# Patient Record
Sex: FEMALE | Race: WHITE | NOT HISPANIC OR LATINO | URBAN - NONMETROPOLITAN AREA
[De-identification: names, ages, dates, MRNs, and addresses within clinical notes are randomized per-mention and may not be internally consistent; named-entity substitution may affect disease eponyms.]

---

## 2017-10-24 ENCOUNTER — APPOINTMENT (RX ONLY)
Dept: URBAN - NONMETROPOLITAN AREA CLINIC 22 | Facility: CLINIC | Age: 82
Setting detail: DERMATOLOGY
End: 2017-10-24

## 2017-10-24 VITALS — HEIGHT: 65 IN | WEIGHT: 115 LBS

## 2017-10-24 DIAGNOSIS — L85.3 XEROSIS CUTIS: ICD-10-CM

## 2017-10-24 DIAGNOSIS — L57.8 OTHER SKIN CHANGES DUE TO CHRONIC EXPOSURE TO NONIONIZING RADIATION: ICD-10-CM

## 2017-10-24 DIAGNOSIS — D22 MELANOCYTIC NEVI: ICD-10-CM

## 2017-10-24 DIAGNOSIS — L81.4 OTHER MELANIN HYPERPIGMENTATION: ICD-10-CM

## 2017-10-24 DIAGNOSIS — L53.8 OTHER SPECIFIED ERYTHEMATOUS CONDITIONS: ICD-10-CM

## 2017-10-24 DIAGNOSIS — L65.9 NONSCARRING HAIR LOSS, UNSPECIFIED: ICD-10-CM

## 2017-10-24 PROBLEM — M12.9 ARTHROPATHY, UNSPECIFIED: Status: ACTIVE | Noted: 2017-10-24

## 2017-10-24 PROBLEM — D22.5 MELANOCYTIC NEVI OF TRUNK: Status: ACTIVE | Noted: 2017-10-24

## 2017-10-24 PROBLEM — L55.1 SUNBURN OF SECOND DEGREE: Status: ACTIVE | Noted: 2017-10-24

## 2017-10-24 PROBLEM — I10 ESSENTIAL (PRIMARY) HYPERTENSION: Status: ACTIVE | Noted: 2017-10-24

## 2017-10-24 PROBLEM — I63.50 CEREBRAL INFARCTION DUE TO UNSPECIFIED OCCLUSION OR STENOSIS OF UNSPECIFIED CEREBRAL ARTERY: Status: ACTIVE | Noted: 2017-10-24

## 2017-10-24 PROCEDURE — ? COUNSELING

## 2017-10-24 PROCEDURE — 99214 OFFICE O/P EST MOD 30 MIN: CPT

## 2017-10-24 PROCEDURE — ? RECOMMENDATIONS

## 2017-10-24 PROCEDURE — ? OTHER

## 2017-10-24 PROCEDURE — ? PATIENT SPECIFIC COUNSELING

## 2017-10-24 ASSESSMENT — LOCATION SIMPLE DESCRIPTION DERM
LOCATION SIMPLE: LEFT LOWER BACK
LOCATION SIMPLE: RIGHT UPPER BACK
LOCATION SIMPLE: LEFT SCALP
LOCATION SIMPLE: RIGHT CHEEK

## 2017-10-24 ASSESSMENT — LOCATION DETAILED DESCRIPTION DERM
LOCATION DETAILED: LEFT MEDIAL FRONTAL SCALP
LOCATION DETAILED: LEFT INFERIOR MEDIAL MIDBACK
LOCATION DETAILED: RIGHT MEDIAL UPPER BACK
LOCATION DETAILED: RIGHT LATERAL MALAR CHEEK
LOCATION DETAILED: RIGHT INFERIOR CENTRAL MALAR CHEEK

## 2017-10-24 ASSESSMENT — LOCATION ZONE DERM
LOCATION ZONE: TRUNK
LOCATION ZONE: FACE
LOCATION ZONE: SCALP

## 2017-10-24 NOTE — HPI: PHOTOAGING (ACTINIC DAMAGE)
Is This A New Presentation, Or A Follow-Up?: Sun Damage
Additional History: Pt is good with SPF when goes out in the sun.

## 2017-10-24 NOTE — PROCEDURE: PATIENT SPECIFIC COUNSELING
Not sure if this is an IBR or a small eczematous area.  Pt will  OTC HC and apply to area when starts to itch to help calm down. Pt will call if does not resolve.
Discussed use of [employer] [place]
Detail Level: Detailed

## 2017-10-24 NOTE — PROCEDURE: OTHER
Other (Free Text): Most likely related to the thyroid problem and will hopefully get that straightened out. There is some pattern accentuation which may be playing a role.
Note Text (......Xxx Chief Complaint.): This diagnosis correlates with the 2nd complaint
Detail Level: Zone
Other (Free Text): No changes noticed compared to photo.

## 2017-10-24 NOTE — PROCEDURE: RECOMMENDATIONS
Recommendation Preamble: The following recommendations were made during the visit:
Recommendations (Free Text): Rogaine if was interested
Detail Level: Zone

## 2018-10-30 ENCOUNTER — APPOINTMENT (RX ONLY)
Dept: URBAN - NONMETROPOLITAN AREA CLINIC 22 | Facility: CLINIC | Age: 83
Setting detail: DERMATOLOGY
End: 2018-10-30

## 2018-10-30 VITALS — WEIGHT: 120 LBS | HEIGHT: 65 IN

## 2018-10-30 DIAGNOSIS — D485 NEOPLASM OF UNCERTAIN BEHAVIOR OF SKIN: ICD-10-CM

## 2018-10-30 DIAGNOSIS — D22 MELANOCYTIC NEVI: ICD-10-CM

## 2018-10-30 DIAGNOSIS — L57.8 OTHER SKIN CHANGES DUE TO CHRONIC EXPOSURE TO NONIONIZING RADIATION: ICD-10-CM

## 2018-10-30 DIAGNOSIS — L57.0 ACTINIC KERATOSIS: ICD-10-CM

## 2018-10-30 PROBLEM — D22.5 MELANOCYTIC NEVI OF TRUNK: Status: ACTIVE | Noted: 2018-10-30

## 2018-10-30 PROBLEM — M12.9 ARTHROPATHY, UNSPECIFIED: Status: ACTIVE | Noted: 2018-10-30

## 2018-10-30 PROBLEM — G40.909 EPILEPSY, UNSPECIFIED, NOT INTRACTABLE, WITHOUT STATUS EPILEPTICUS: Status: ACTIVE | Noted: 2018-10-30

## 2018-10-30 PROBLEM — D48.5 NEOPLASM OF UNCERTAIN BEHAVIOR OF SKIN: Status: ACTIVE | Noted: 2018-10-30

## 2018-10-30 PROBLEM — I10 ESSENTIAL (PRIMARY) HYPERTENSION: Status: ACTIVE | Noted: 2018-10-30

## 2018-10-30 PROCEDURE — ? PRESCRIPTION

## 2018-10-30 PROCEDURE — ? COUNSELING

## 2018-10-30 PROCEDURE — ? TREATMENT REGIMEN

## 2018-10-30 PROCEDURE — 11306 SHAVE SKIN LESION 0.6-1.0 CM: CPT

## 2018-10-30 PROCEDURE — ? SHAVE REMOVAL

## 2018-10-30 PROCEDURE — 99213 OFFICE O/P EST LOW 20 MIN: CPT | Mod: 25

## 2018-10-30 RX ORDER — FLUOROURACIL 2 G/40G
CREAM TOPICAL
Qty: 1 | Refills: 0 | Status: ERX

## 2018-10-30 ASSESSMENT — LOCATION DETAILED DESCRIPTION DERM
LOCATION DETAILED: LEFT CENTRAL ANTERIOR NECK
LOCATION DETAILED: RIGHT INFERIOR MEDIAL MIDBACK
LOCATION DETAILED: LEFT UPPER CUTANEOUS LIP

## 2018-10-30 ASSESSMENT — LOCATION ZONE DERM
LOCATION ZONE: LIP
LOCATION ZONE: NECK
LOCATION ZONE: TRUNK

## 2018-10-30 ASSESSMENT — LOCATION SIMPLE DESCRIPTION DERM
LOCATION SIMPLE: LEFT LIP
LOCATION SIMPLE: RIGHT LOWER BACK
LOCATION SIMPLE: NECK

## 2018-10-30 NOTE — PROCEDURE: TREATMENT REGIMEN
Detail Level: Simple
Plan: Pt educated on treatment options and elected to use topical 5FU.  Written and verbal instructions given.  Call with reaction.